# Patient Record
Sex: MALE | Race: WHITE
[De-identification: names, ages, dates, MRNs, and addresses within clinical notes are randomized per-mention and may not be internally consistent; named-entity substitution may affect disease eponyms.]

---

## 2018-06-25 ENCOUNTER — HOSPITAL ENCOUNTER (EMERGENCY)
Dept: HOSPITAL 17 - NEPD | Age: 48
Discharge: HOME | End: 2018-06-25
Payer: COMMERCIAL

## 2018-06-25 VITALS
DIASTOLIC BLOOD PRESSURE: 78 MMHG | SYSTOLIC BLOOD PRESSURE: 115 MMHG | HEART RATE: 61 BPM | OXYGEN SATURATION: 100 % | RESPIRATION RATE: 18 BRPM

## 2018-06-25 VITALS
HEART RATE: 88 BPM | DIASTOLIC BLOOD PRESSURE: 63 MMHG | SYSTOLIC BLOOD PRESSURE: 135 MMHG | OXYGEN SATURATION: 100 % | RESPIRATION RATE: 12 BRPM | TEMPERATURE: 98.1 F

## 2018-06-25 VITALS — HEIGHT: 78 IN | WEIGHT: 165.35 LBS | BODY MASS INDEX: 19.13 KG/M2
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
## 2018-06-25 DIAGNOSIS — S67.22XA: Primary | ICD-10-CM

## 2018-06-25 PROCEDURE — 99283 EMERGENCY DEPT VISIT LOW MDM: CPT

## 2018-06-25 PROCEDURE — 73130 X-RAY EXAM OF HAND: CPT

## 2018-06-25 NOTE — RADRPT
EXAM DATE:  6/25/2018 6:56 PM EDT

AGE/SEX:        48 years / Male



INDICATIONS:  Pain, swelling and abrasion left posterior hand. Crush injury, caught hand between tras
h truck and trash can



CLINICAL DATA:  This is the patient's initial encounter. Patient reports that signs and symptoms have
 been present for 1 day and indicates a pain score of 9/10. 

                                                                          

MEDICAL/SURGICAL HISTORY:       None. None.



COMPARISON:      No prior exams available for comparison. 





FINDINGS:  

3 views of the left hand demonstrate no fracture or dislocation. No soft tissue abnormality or concer
taylor radiopaque foreign body is seen. There is a metallic ring on the third digit.



CONCLUSION: 

No acute left hand abnormality is identified.









Electronically signed by: Satya Lew MD  6/25/2018 7:17 PM EDT
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
